# Patient Record
Sex: MALE | Race: WHITE
[De-identification: names, ages, dates, MRNs, and addresses within clinical notes are randomized per-mention and may not be internally consistent; named-entity substitution may affect disease eponyms.]

---

## 2018-01-19 ENCOUNTER — HOSPITAL ENCOUNTER (OUTPATIENT)
Dept: HOSPITAL 17 - HRAD | Age: 58
Discharge: HOME | End: 2018-01-19
Payer: COMMERCIAL

## 2018-01-19 VITALS
OXYGEN SATURATION: 92 % | HEART RATE: 76 BPM | TEMPERATURE: 97.8 F | RESPIRATION RATE: 20 BRPM | SYSTOLIC BLOOD PRESSURE: 153 MMHG | DIASTOLIC BLOOD PRESSURE: 88 MMHG

## 2018-01-19 VITALS
DIASTOLIC BLOOD PRESSURE: 91 MMHG | OXYGEN SATURATION: 96 % | HEART RATE: 62 BPM | SYSTOLIC BLOOD PRESSURE: 171 MMHG | RESPIRATION RATE: 16 BRPM

## 2018-01-19 VITALS
OXYGEN SATURATION: 95 % | DIASTOLIC BLOOD PRESSURE: 79 MMHG | SYSTOLIC BLOOD PRESSURE: 125 MMHG | RESPIRATION RATE: 18 BRPM | HEART RATE: 67 BPM

## 2018-01-19 VITALS
DIASTOLIC BLOOD PRESSURE: 101 MMHG | TEMPERATURE: 97.8 F | OXYGEN SATURATION: 97 % | SYSTOLIC BLOOD PRESSURE: 161 MMHG | HEART RATE: 63 BPM | RESPIRATION RATE: 20 BRPM

## 2018-01-19 VITALS — BODY MASS INDEX: 30.86 KG/M2 | WEIGHT: 220.46 LBS | HEIGHT: 71 IN

## 2018-01-19 VITALS
RESPIRATION RATE: 18 BRPM | OXYGEN SATURATION: 94 % | DIASTOLIC BLOOD PRESSURE: 84 MMHG | HEART RATE: 68 BPM | SYSTOLIC BLOOD PRESSURE: 146 MMHG

## 2018-01-19 VITALS
DIASTOLIC BLOOD PRESSURE: 73 MMHG | RESPIRATION RATE: 16 BRPM | OXYGEN SATURATION: 96 % | HEART RATE: 70 BPM | SYSTOLIC BLOOD PRESSURE: 160 MMHG

## 2018-01-19 VITALS
OXYGEN SATURATION: 96 % | SYSTOLIC BLOOD PRESSURE: 141 MMHG | DIASTOLIC BLOOD PRESSURE: 79 MMHG | RESPIRATION RATE: 18 BRPM | HEART RATE: 68 BPM

## 2018-01-19 VITALS
RESPIRATION RATE: 18 BRPM | DIASTOLIC BLOOD PRESSURE: 87 MMHG | OXYGEN SATURATION: 93 % | HEART RATE: 62 BPM | SYSTOLIC BLOOD PRESSURE: 151 MMHG

## 2018-01-19 DIAGNOSIS — R94.5: Primary | ICD-10-CM

## 2018-01-19 DIAGNOSIS — K75.81: ICD-10-CM

## 2018-01-19 DIAGNOSIS — D69.3: ICD-10-CM

## 2018-01-19 PROCEDURE — 88307 TISSUE EXAM BY PATHOLOGIST: CPT

## 2018-01-19 PROCEDURE — 47000 NEEDLE BIOPSY OF LIVER PERQ: CPT

## 2018-01-19 PROCEDURE — 88313 SPECIAL STAINS GROUP 2: CPT

## 2018-01-19 PROCEDURE — 77012 CT SCAN FOR NEEDLE BIOPSY: CPT

## 2018-01-19 PROCEDURE — 76937 US GUIDE VASCULAR ACCESS: CPT

## 2018-01-19 NOTE — RADRPT
EXAM DATE/TIME:  01/19/2018 09:37 

 

[facility] COMPARISON:  

No previous studies available for comparison.

                     

 

INDICATIONS :     

Patient presents with chronic ITP here for an IV to undergo a liver biopsy.

                     

 

MEDICAL HISTORY :     

Thrombocytopenia

 

SURGICAL HISTORY :     

Broken ankle

 

ENCOUNTER:     

Initial

 

ACUITY:     

1 day

 

PAIN SCORE:     

0/10

                     

                     

 

IMAGE SERIES:                 

 

ACCESS:             

Right basilic vein 

 

DEVICE(S):          

1.) 3/4 Khmer Dilator 

 

 

PROCEDURE :     

1.  Ultrasound guided venous access.

 

The risks, benefits and alternatives to the procedure were explained and verbal and written consent w
as obtained.  The site was prepped in sterile fashion.  Full sterile technique was used, including ca
p, mask, sterile gloves and gown and a large sterile sheet.  Hand hygiene and 2% chlorhexidine and/or
 betadine/alcohol prep was utilized per protocol for cutaneous antisepsis.  Sterile gel and sterile p
robe cover were utilized for ultrasound guidance.   The skin and subcutaneous tissues were infiltrate
d with local anesthetic solution.  

 

With ultrasound guidance the prescribed vein was punctured for venous access. A 4 Khmer dilator was 
placed and was flushed and locked with heparin. The patient tolerated procedure well and there were n
o complications.

 

CONCLUSION:     Uncomplicated ultrasound guided venous access.

 

 

 

 Dima Esteban MD on January 19, 2018 at 9:41           

Board Certified Radiologist.

 This report was verified electronically.

## 2018-01-19 NOTE — PD.RAD
Post CT Procedure Prog Note


Pre Procedure Diagnosis:  


(1) Abnormal LFTs


Post Procedure Diagnosis:  


(1) Abnormal LFTs


Procedure Date:


Jan 19, 2018


Supervising Radiologist:


Dima Mercedes


Estimated blood loss:


minimal


Anesthesia:  Conscious Sedation


Plan of Activity


Patient to Unit:  [facility]


Patient Condition:  Good


See PACS Report for procedural detail/treatment





Biopsy


Imaging Guidance:  CT


Side:  Right


Biopsy Procedure:  Liver


Specimen:  Core Biopsy


Additional Detail:


2 18G cores taken


Plan


to [facility] for monitoring then discharge in 4 hours.











Dima Mercedes MD Jan 19, 2018 10:16

## 2018-01-19 NOTE — RADRPT
EXAM DATE/TIME:  01/19/2018 09:54 

 

[facility] COMPARISON:     

No previous studies available for comparison.

 

 

 

INDICATIONS :      

Abnormal liver function. 

                     

 

 

 

SEDATION TIME:       

15 minutes

 

 

BIOPSY SITE:        

liver

                     

 

MEDICATION(S):

1.) 3 mg midazolam (Versed) IV  

2.) 150 mcg fentanyl (Sublimaze) IV  

 

 

DEVICE(S):

1.) 18 gauge Mattson blunt needle 10cm

                     

 

MEDICAL HISTORY :       

None. 

 

SURGICAL HISTORY :     

None.   

 

ENCOUNTER:     

Initial

 

ACUITY:     

1 day

 

PAIN SCORE:     

0/10

 

LOCATION:     

Right lateral

                     

A total of two core specimen(s) were obtained and sent to the laboratory for pathologic evaluation.

 

 

PROCEDURE:

1.   CT guided liver biopsy.

2.   Conscious sedation with continuous EKG and oximetry monitoring.

 

Prior to the procedure informed consent was obtained.  Any appropriate prior imaging studies were rev
iewed. 

Using automated exposure control and adjustment of the mA and/or kV according to patient size, radiat
ion dose was kept as low as reasonably achievable to obtain optimal diagnostic quality images.  DICOM
 format image data is available electronically for review and comparison.   

 

 

The site was prepped in a sterile fashion.  Full sterile technique was used, including cap, mask, justin
rile gloves and gown and a large sterile sheet.  Hand hygiene and 2% chlorhexidine and/or betadine/al
cohol prep was utilized per protocol for cutaneous antisepsis.  The skin and subcutaneous tissues wer
e infiltrated with local anesthetic solution.

 

With CT guidance the previously identified target was localized. Biopsy was performed using the presc
ribed needle as above.  Adequate hemostasis was obtained with compression at the puncture site.

 

Follow-up CT scan reveals no hemorrhage.

 

The patient tolerated the procedure well and there were no complications. The patient was returned to
 the Radiology Outpatient Unit in stable condition.

 

CONCLUSION:     Uncomplicated CT guided biopsy of the right lobe of the liver.

 

 

 

 Dima Mercedes MD on January 19, 2018 at 14:55           

Board Certified Radiologist.

 This report was verified electronically.